# Patient Record
Sex: FEMALE | Race: BLACK OR AFRICAN AMERICAN | NOT HISPANIC OR LATINO | URBAN - METROPOLITAN AREA
[De-identification: names, ages, dates, MRNs, and addresses within clinical notes are randomized per-mention and may not be internally consistent; named-entity substitution may affect disease eponyms.]

---

## 2020-12-21 ENCOUNTER — HOSPITAL ENCOUNTER (EMERGENCY)
Facility: OTHER | Age: 35
Discharge: HOME | End: 2020-12-21
Attending: EMERGENCY MEDICINE
Payer: MEDICAID

## 2020-12-21 VITALS
HEART RATE: 79 BPM | BODY MASS INDEX: 41.78 KG/M2 | WEIGHT: 260 LBS | HEIGHT: 66 IN | RESPIRATION RATE: 16 BRPM | SYSTOLIC BLOOD PRESSURE: 129 MMHG | OXYGEN SATURATION: 100 % | TEMPERATURE: 98 F | DIASTOLIC BLOOD PRESSURE: 75 MMHG

## 2020-12-21 DIAGNOSIS — S83.8X1A MENISCAL INJURY, RIGHT, INITIAL ENCOUNTER: Primary | ICD-10-CM

## 2020-12-21 DIAGNOSIS — R52 PAIN: ICD-10-CM

## 2020-12-21 LAB
B-HCG UR QL: NEGATIVE
CTP QC/QA: YES

## 2020-12-21 PROCEDURE — 29505 APPLICATION LONG LEG SPLINT: CPT | Mod: RT

## 2020-12-21 PROCEDURE — 99284 EMERGENCY DEPT VISIT MOD MDM: CPT | Mod: 25

## 2020-12-21 PROCEDURE — 81025 URINE PREGNANCY TEST: CPT | Performed by: EMERGENCY MEDICINE

## 2020-12-21 PROCEDURE — 25000003 PHARM REV CODE 250: Performed by: EMERGENCY MEDICINE

## 2020-12-21 RX ORDER — CYCLOBENZAPRINE HCL 10 MG
10 TABLET ORAL 3 TIMES DAILY PRN
Qty: 30 TABLET | Refills: 0 | Status: SHIPPED | OUTPATIENT
Start: 2020-12-21 | End: 2020-12-31

## 2020-12-21 RX ORDER — KETOROLAC TROMETHAMINE 10 MG/1
10 TABLET, FILM COATED ORAL
Status: COMPLETED | OUTPATIENT
Start: 2020-12-21 | End: 2020-12-21

## 2020-12-21 RX ORDER — NAPROXEN 500 MG/1
500 TABLET ORAL 2 TIMES DAILY WITH MEALS
Qty: 20 TABLET | Refills: 0 | Status: SHIPPED | OUTPATIENT
Start: 2020-12-21

## 2020-12-21 RX ORDER — LIDOCAINE 50 MG/G
1 PATCH TOPICAL DAILY PRN
Qty: 15 PATCH | Refills: 0 | Status: SHIPPED | OUTPATIENT
Start: 2020-12-21

## 2020-12-21 RX ADMIN — KETOROLAC TROMETHAMINE 10 MG: 10 TABLET, FILM COATED ORAL at 09:12

## 2020-12-21 NOTE — ED NOTES
"Able to give + return demonstartion of walking w/ crutches and knee brace, requesting to leave now , stated, "I got to go, my ride is here"  OK to discharge  "

## 2020-12-21 NOTE — ED PROVIDER NOTES
Encounter Date: 12/21/2020    SCRIBE #1 NOTE: I, Suzie Nevarez, am scribing for, and in the presence of, Freddy Sutherland MD.       History     Chief Complaint   Patient presents with    Knee Pain     patient did a lot of dancing last night & woke up with right knee pain, mild edema noted to knee area     This is a 35 y.o female who presents to the emergency department with c/o right knee pain and swelling since last night. She reports that she was at a [employer] party when she was dancing she felt a painless pop in her knee. She was still able to ambulate and bear weight. She did not have pain or swelling until this morning. She denies any calf pain, injuries or falls. She reports symptoms are exacerbated when lifting knee up. No other complaints. She denies any analgesics for pain. NKDA.       The history is provided by the patient.     Review of patient's allergies indicates:  No Known Allergies  No past medical history on file.  No past surgical history on file.  No family history on file.  Social History     Tobacco Use    Smoking status: Not on file   Substance Use Topics    Alcohol use: Not on file    Drug use: Not on file     Review of Systems   Constitutional: Negative for chills and fever.   HENT: Negative for rhinorrhea, sore throat and trouble swallowing.    Respiratory: Negative for chest tightness, shortness of breath and wheezing.    Cardiovascular: Negative for chest pain, palpitations and leg swelling.   Gastrointestinal: Negative for abdominal pain, diarrhea, nausea and vomiting.   Genitourinary: Negative for dysuria and vaginal bleeding.   Musculoskeletal: Positive for arthralgias.        Positive for right knee pain and swelling  Negative for right calf pain   Neurological: Negative for speech difficulty and light-headedness.   All other systems reviewed and are negative.      Physical Exam     Initial Vitals [12/21/20 0925]   BP Pulse Resp Temp SpO2   127/68 88 16 98.4 °F (36.9 °C) 100 %       MAP       --         Physical Exam    Nursing note and vitals reviewed.  Constitutional: She appears well-developed and well-nourished. She is not diaphoretic. No distress.   HENT:   Head: Normocephalic and atraumatic.   Eyes: Conjunctivae and EOM are normal. Pupils are equal, round, and reactive to light.   Neck: Normal range of motion. Neck supple. No tracheal deviation present. No JVD present.   Cardiovascular: Normal rate, regular rhythm, normal heart sounds and intact distal pulses. Exam reveals no gallop and no friction rub.    No murmur heard.  Pulmonary/Chest: Breath sounds normal. No respiratory distress. She has no wheezes. She has no rhonchi. She has no rales. She exhibits no tenderness.   Musculoskeletal:      Comments: Right knee: TTP of the right knee at the quadratus tendon insertion.  Positive joint line tenderness to palpation medially.  No MCL/LCL tenderness palpation.  No crepitus. No bony tenderness to palpation. Normal Lachman.  Neurovascularly intact distally   Neurological: She is alert and oriented to person, place, and time. She has normal strength. No cranial nerve deficit.   Skin: Skin is warm and dry. Capillary refill takes less than 2 seconds. No rash noted. No erythema.   Psychiatric: She has a normal mood and affect. Thought content normal.         ED Course   Procedures  Labs Reviewed   POCT URINE PREGNANCY          Imaging Results          X-Ray Knee 3 View Right (Final result)  Result time 12/21/20 10:23:42    Final result by Lidia Peter MD (12/21/20 10:23:42)                 Impression:      No acute osseous abnormality seen.      Electronically signed by: Lidia Peter  Date:    12/21/2020  Time:    10:23             Narrative:    EXAMINATION:  XR KNEE 3 VIEW RIGHT    CLINICAL HISTORY:  Pain, unspecified    TECHNIQUE:  AP, lateral, and Merchant views of the right knee were performed.    COMPARISON:  None    FINDINGS:  No acute fracture or dislocation seen.  No soft  tissue edema or significant suprapatellar joint effusion.  No radiopaque retained foreign body.    Joint spaces are preserved.                              X-Rays:   Independently Interpreted Readings:   Other Readings:  Right Knee: No fracture. No dislocation. Narrow joint space medially.    Medical Decision Making:   History:   Old Medical Records: I decided to obtain old medical records.  Differential Diagnosis:   Fracture, dislocation, ligamentous injury, tenderness injury, neurovascular injury, muscular tear, rhabdomyolysis, compartment syndrome    Clinical Tests:   Radiological Study: Reviewed and Ordered  ED Management:  Discussed with patient that given her symptoms, there is possibility of meniscal injury that occurred while she was dancing.  We will place her in a knee immobilizer, give her pain medications and crutches and have her follow up with Orthopedics. I feel it is safe and appropriate at this time for the patient to be discharged for follow up and re-evaluation as detailed in the discharge instructions. I have discussed the specifics of the workup with the patient/guardian and the patient/guardian has verbalized understanding of the details of the workup, the diagnosis, the treatment plan, and the need for outpatient follow-up. Although the patient has no emergent etiology, patient/guardian understands the ED visit today was primarily to address immediate concerns and to rule out emergent causes of the symptoms and this does not preclude the development of an emergent condition after discharge. The patient/guardian is comfortable going home.  I educated the patient/guardian on the warning signs and symptoms for which they must seek immediate medical attention. All questions addressed and patient/guardian were given discharge instructions and followup information.               Scribe Attestation:   Scribe #1: I performed the above scribed service and the documentation accurately describes the  services I performed. I attest to the accuracy of the note.    Attending Attestation:           Physician Attestation for Scribe:  Physician Attestation Statement for Scribe #1: I, Freddy Sutherland MD, reviewed documentation, as scribed by Suzie Nevarez in my presence, and it is both accurate and complete.                           Clinical Impression:     ICD-10-CM ICD-9-CM   1. Meniscal injury, right, initial encounter  S83.8X1A 959.7   2. Pain  R52 780.96                         ED Disposition Condition    Discharge Stable        ED Prescriptions     Medication Sig Dispense Start Date End Date Auth. Provider    naproxen (NAPROSYN) 500 MG tablet Take 1 tablet (500 mg total) by mouth 2 (two) times daily with meals. For pain 20 tablet 12/21/2020  Freddy Sutherland MD    cyclobenzaprine (FLEXERIL) 10 MG tablet Take 1 tablet (10 mg total) by mouth 3 (three) times daily as needed for Muscle spasms. 30 tablet 12/21/2020 12/31/2020 Freddy Sutherland MD    lidocaine (LIDODERM) 5 % Place 1 patch onto the skin daily as needed. Remove & Discard patch within 12 hours or as directed by MD 15 patch 12/21/2020  Freddy Sutherland MD        Follow-up Information     Follow up With Specialties Details Why Contact Info    [facility] Orthopedic Surgery Schedule an appointment as soon as possible for a visit in 3 days For orthopedic follow-up and re-evaluation [address] [facility] [id number]  [phone]      [facility]  As needed, for any new or worsening symptoms [id number] [facility] [id number] [phone]                                       Freddy Sutherland MD  12/21/20 2029